# Patient Record
Sex: FEMALE | Race: WHITE | Employment: OTHER | URBAN - METROPOLITAN AREA
[De-identification: names, ages, dates, MRNs, and addresses within clinical notes are randomized per-mention and may not be internally consistent; named-entity substitution may affect disease eponyms.]

---

## 2019-07-17 ENCOUNTER — OFFICE VISIT (OUTPATIENT)
Dept: ORTHOPEDIC SURGERY | Age: 84
End: 2019-07-17

## 2019-07-17 VITALS
WEIGHT: 214 LBS | HEIGHT: 65 IN | TEMPERATURE: 96.9 F | DIASTOLIC BLOOD PRESSURE: 86 MMHG | OXYGEN SATURATION: 95 % | SYSTOLIC BLOOD PRESSURE: 125 MMHG | RESPIRATION RATE: 17 BRPM | HEART RATE: 67 BPM | BODY MASS INDEX: 35.65 KG/M2

## 2019-07-17 DIAGNOSIS — M72.2 PLANTAR FASCIITIS OF LEFT FOOT: Primary | ICD-10-CM

## 2019-07-17 DIAGNOSIS — E66.01 SEVERE OBESITY (HCC): ICD-10-CM

## 2019-07-17 RX ORDER — TRIAMCINOLONE ACETONIDE 40 MG/ML
40 INJECTION, SUSPENSION INTRA-ARTICULAR; INTRAMUSCULAR ONCE
Qty: 1 ML | Refills: 0
Start: 2019-07-17 | End: 2019-07-17

## 2019-07-17 NOTE — PATIENT INSTRUCTIONS
Plantar Fasciitis: Exercises  Your Care Instructions  Here are some examples of typical rehabilitation exercises for your condition. Start each exercise slowly. Ease off the exercise if you start to have pain. Your doctor or physical therapist will tell you when you can start these exercises and which ones will work best for you. How to do the exercises  Towel stretch    1. Sit with your legs extended and knees straight. 2. Place a towel around your foot just under the toes. 3. Hold each end of the towel in each hand, with your hands above your knees. 4. Pull back with the towel so that your foot stretches toward you. 5. Hold the position for at least 15 to 30 seconds. 6. Repeat 2 to 4 times a session, up to 5 sessions a day. Calf stretch    1. Stand facing a wall with your hands on the wall at about eye level. Put the leg you want to stretch about a step behind your other leg. 2. Keeping your back heel on the floor, bend your front knee until you feel a stretch in the back leg. 3. Hold the stretch for 15 to 30 seconds. Repeat 2 to 4 times. Plantar fascia and calf stretch    1. Stand on a step as shown above. Be sure to hold on to the banister. 2. Slowly let your heels down over the edge of the step as you relax your calf muscles. You should feel a gentle stretch across the bottom of your foot and up the back of your leg to your knee. 3. Hold the stretch about 15 to 30 seconds, and then tighten your calf muscle a little to bring your heel back up to the level of the step. Repeat 2 to 4 times. Towel curls    1. While sitting, place your foot on a towel on the floor and scrunch the towel toward you with your toes. 2. Then, also using your toes, push the towel away from you. [place] pickups    1. Put marbles on the floor next to a cup.  2. Using your toes, try to lift the marbles up from the floor and put them in the cup. Follow-up care is a key part of your treatment and safety.  Be sure to make and go to all appointments, and call your doctor if you are having problems. It's also a good idea to know your test results and keep a list of the medicines you take. Where can you learn more? Go to http://janay-franco.info/. Tamie Farah in the search box to learn more about \"Plantar Fasciitis: Exercises. \"  Current as of: September 20, 2018  Content Version: 11.9  © 20066528-1476 [employer]. Care instructions adapted under license by [employer] (which disclaims liability or warranty for this information). If you have questions about a medical condition or this instruction, always ask your healthcare professional. [address] any warranty or liability for your use of this information.

## 2019-07-17 NOTE — PROGRESS NOTES
AMBULATORY PROGRESS NOTE      Patient: Shai Calix             MRN: [id number]     SSN: [id number] Body mass index is 35.61 kg/m². YOB: 1933     AGE: 80 y.o. SEX: female    PCP: No primary care provider on file. IMPRESSION/DIAGNOSIS AND TREATMENT PLAN     DIAGNOSES  1. Plantar fasciitis of left foot    2. Severe obesity ([address]        Orders Placed This Encounter    INJECT TENDON SHEATH/LIGAMENT    AMB SUPPLY ORDER    TRIAMCINOLONE ACETONIDE INJ    triamcinolone acetonide (KENALOG) 40 mg/mL injection      Shai Calix understands her diagnoses and the proposed plan. Plan:    1) DME Order: [employer] heel inserts. 2) HEP with plantar fascial exercises. 3) Cortisone injection to the left plantar fascia: 1 mL Kenalog and 3 mL Lidocaine. 4) Use cryotherapy as directed after injection. 5) Continue activity modification as directed. RTO - 3 weeks     HPI AND EXAMINATION     Shai Calix IS A 80 y.o. female who presents to my outpatient office complaining of left foot pain. Ms. Jyothi Elliott reports that she has been experiencing pain in her left plantar heel for the past month. She notes that she experiences pain as soon as she walks in the mornings. She finds that she experiences the worst pain when she stands after sitting for a period of time. She states that she followed up with her PCP, who prescribed her Prednisone and provided exercises for her plantar fascia, which she notes did not help with her pain. She states that she finds herself limping at times. Of note, the patient states that she had Achilles tendinitis in her right leg, and recalls that after receiving an injection to her right knee, her Achilles tendon pain improved. She presents to the office today with [employer] shoes. The patient is currently taking Plavix.      Visit Vitals  /86 (BP 1 Location: Left arm, BP Patient Position: Sitting)   Pulse 67   Temp 96.9 °F (36.1 °C) (Oral)   Resp 17   Ht 5' 5\" (1.651 m)   Wt 214 lb (97.1 kg)   SpO2 95%   BMI 35.61 kg/m²     Appearance: Alert, well appearing and pleasant patient who is in no distress, oriented to person, place/time, and who follows commands. She is accompanied to the office by her . Psychiatric: Affect and mood are appropriate. Cardiovascular/Peripheral Vascular: Normal Pulses to each hand and foot  Musculoskeletal:  LOCATION:  Left FOOT/ANKLE  Integumentary: No rashes, skin patches, wounds, or abrasions to the right or left legs       Warm and normal color. No regions of expressible drainage. Palpable fullness or mass is not present      Gait: Limp with gait is not present       Tenderness: mild inferomedial PLANTAR FASCIA REGION, with no NODULARITIES        Mild tenderness to the central heel        NT to the Achilles tendon        Negative medial lateral compression test      Motor/Strength/Tone Exam: Normal DF, INV,EVERSION. Slightly diminished PF           strength due to plantar fascial tenderness      Sensory Exam:   Intact Normal Sensation to ankle/foot      Stability Testing: No anterolateral or varus instability of the Ankle or Subtalar Joints               No peroneal tendon instability noted      ROM: Normal ROM noted to ankle/foot      Contractures: No Achilles or Gastrocnemius Contractures      Calf tenderness: Absent for calf or gastrocnemius muscle regions       Soft, supple, non tender, non taut lower extremity compartments       Alignment: Neutral Hindfoot  Wounds/Abrasions:   None present  Extremities:   No embolic phenomena to the toes or hands         No significant edema to the foot and or toes.         Lower extremities are warm and appear well perfused    DVT: No evidence of DVT seen on examination at this time    No calf swelling, no tenderness to calf muscles  Lymphatic:  No Evidence of Lymphedema  Vascular: Medial Border of Tibia Region: Edema is not present        Pulses: Dorsalis Pedis &  Posterior Tibial Pulses : Palpable yes        Varicosities Lower Limbs: None    Neuro: Negative bilateral Straight leg raise (seated position)   no Tinel's at PM NV Bundle Tarsal Canal   no Proximal Tarsal Tunnel Tenderness    no Distal Tarsal Tunnel Tenderness    See Musculoskeletal section for pertinent individual extremity examination    No abnormal hand/wrist, foot/ankle, or facial/neck tremors. CHART REVIEW     Past Medical History:   Diagnosis Date    Arthritis     Hypertension     Left knee pain     Right shoulder pain      Current Outpatient Medications   Medication Sig    triamcinolone acetonide (KENALOG) 40 mg/mL injection 1 mL by IntraMUSCular route once for 1 dose.  furosemide (LASIX) 40 mg tablet     diclofenac (VOLTAREN) 1 % gel Apply 2 g to affected area four (4) times daily.  HYDROCHLOROTHIAZIDE PO Take  by mouth.  POTASSIUM PO Take  by mouth.  Glucosamine &Chondroit-MV-Min3 [id number] mg Tab Take  by mouth.  lisinopril (PRINIVIL, ZESTRIL) 20 mg tablet Take 20 mg by mouth daily. Indications: HYPERTENSION    atorvastatin (LIPITOR) 20 mg tablet Take 20 mg by mouth two (2) times a day. Indications: HYPERCHOLESTEROLEMIA    omega-3 fatty acids-vitamin e (FISH OIL) 1,000 mg cap Take 1 Cap by mouth daily. No current facility-administered medications for this visit. Allergies   Allergen Reactions    Sulfatrim Ds Itching    Tape [Adhesive] Rash     Past Surgical History:   Procedure Laterality Date    HX APPENDECTOMY      HX GYN      part hyst     Social History     Occupational History    Not on file   Tobacco Use    Smoking status: Former Smoker    Smokeless tobacco: Never Used   Substance and Sexual Activity    Alcohol use: No    Drug use: No    Sexual activity: Not on file     Family History   Adopted: Yes        REVIEW OF SYSTEMS : 7/17/2019  ALL BELOW ARE Negative except : SEE HPI     Constitutional: Negative for fever, chills and weight loss.  Neg Weight Loss  Cardiovascular: Negative for chest pain, claudication and leg swelling. SOB, VERMA   Gastrointestinal/Urological: Negative for  pain, N/V/D/C, Blood in stool or urine,dysuria                         Hematuria, Incontinence, pelvic pain  Musculoskeletal: see HPI. Neurological: Negative for dizziness and weakness, headaches,Visual Changes             Confusion,  Or Seizures,   Psychiatric/Behavioral: Negative for depression, memory loss and substance abuse. Extremities:  Negative for hair changes, rash or skin lesion changes. Hematologic: Negative for Bleeding problems, bruising, pallor or swollen lymph nodes. Peripheral Vascular: No calf pain, vascular vein tenderness to calf pain              No calf throbbing, posterior knee throbbing pain     DIAGNOSTIC IMAGING       Please see above section of this report. I have personally reviewed the results of the above study. The interpretation of this study is my professional opinion. PROCEDURE         [facility] PROCEDURE OUTPATIENT PROCEDURE    PROCEDURE:  Injection of the Left PLANTAR FASCIA     Chart reviewed for the following:     I, Doris Dillon MD, have reviewed the History, Physical and updated the Allergic reactions for [address] performed immediately prior to start of procedure:       * Patient was identified by name Yesy Kimball and date of birth   * Agreement on procedure being performed was verified  * Risks and Benefits explained to the patient: see below  * Procedure site verified and marked as necessary  * Patient was positioned for comfort  * Verbal Consent and Written Consent Verified by myself and my office staff. * Complications: None  *  All patient and/or family questions answered     The procedure was explained to the patient and possible adverse reactions were discussed.   These risks included, but not limited to: bleeding, infection, septic arthritis, local skin irritation (skin discoloration, hyperpigmentation, hypopigmentation, thinning of the skin, development of a wound, skin necrosis), synovitis, subcutaneous fat pad atrophy, subcutaneous abscess, tendon rupture. Time: 4:38 PM  Date of procedure: 7/17/2019  Procedure performed by: Sandra Moralez MD  Provider assisted by:  MA  Patient assisted by: self  How tolerated by patient: tolerated the procedure well with no complications  Comments: none    After verbal and informed consent, and after all patient and family questions answered, the procedure was performed as below: The Left MEDIAL HINDFOOT area was prepped and cleansed with: sterile fashion using a follow solution: ALCOHOL/BETADINE STERILE PREP. The injection was administered:  A solution of 40 mg of  KENALOG and 3 ml of LIDOCAINE % plain was used. The pain assessment score PRIOR/AFTER to the injection: 4 /10 //  too soon at determine due to this immediate injection /10 pain severity, mild intensity, aching Pain quality    Post injection instructions were given: removed band aid 3 hours, Wash site with clean soap, No further dressings there after. Call me if any: pain, redness, drainage, fever. Aspen Mcdowell tolerated the procedure well and was advised on the signs of infection and instructed to go to the ER or call the office if Aspen Ramirez Genjoanna becomes concerned about the area being infected. Written by Belen Hodge, as dictated by Dr. Girish Byers. I, Dr. Girish Byers, confirm that all documentation is accurate.

## 2022-03-20 PROBLEM — E66.01 SEVERE OBESITY (HCC): Status: ACTIVE | Noted: 2019-07-17

## 2023-05-18 ENCOUNTER — OFFICE VISIT (OUTPATIENT)
Age: 88
End: 2023-05-18

## 2023-05-18 VITALS — BODY MASS INDEX: 31.41 KG/M2 | WEIGHT: 184 LBS | HEIGHT: 64 IN

## 2023-05-18 DIAGNOSIS — M54.40 LOW BACK PAIN WITH SCIATICA, SCIATICA LATERALITY UNSPECIFIED, UNSPECIFIED BACK PAIN LATERALITY, UNSPECIFIED CHRONICITY: Primary | ICD-10-CM

## 2023-05-18 RX ORDER — GABAPENTIN 100 MG/1
CAPSULE ORAL
COMMUNITY
Start: 2023-05-09

## 2023-05-18 RX ORDER — LOSARTAN POTASSIUM AND HYDROCHLOROTHIAZIDE 12.5; 1 MG/1; MG/1
TABLET ORAL
COMMUNITY
Start: 2023-05-09

## 2023-05-18 RX ORDER — ATORVASTATIN CALCIUM 40 MG/1
TABLET, FILM COATED ORAL
COMMUNITY
Start: 2023-03-24

## 2023-05-18 RX ORDER — RANOLAZINE 500 MG/1
TABLET, EXTENDED RELEASE ORAL
COMMUNITY
Start: 2023-03-24

## 2023-05-18 RX ORDER — FUROSEMIDE 40 MG/1
TABLET ORAL
COMMUNITY
Start: 2023-05-10

## 2023-05-18 NOTE — PATIENT INSTRUCTIONS
Back Pain: Care Instructions  Overview     In most cases, there isn't a clear cause for back pain. It may be related to problems with muscles and ligaments of the back. It may also be related to problems with the nerves, discs, or bones of the back. Moving, lifting, standing, sitting, or sleeping in an awkward way can strain the back. Arthritis is another cause of back pain. Although it may hurt a lot, back pain usually improves on its own within several weeks. Most people recover in 12 weeks or less. Using self-care, such as ice or heat and light activity (like walking) may help you feel better sooner. Follow-up care is a key part of your treatment and safety. Be sure to make and go to all appointments, and call your doctor if you are having problems. It's also a good idea to know your test results and keep a list of the medicines you take. How can you care for yourself at home? Sit or lie in positions that are most comfortable and reduce your pain. Try one of these positions when you lie down:  Lie on your back with your knees bent and supported by pillows. Lie on the floor with your legs on the seat of a sofa or chair. Lie on your side with your knees and hips bent and a pillow between your legs. Lie on your stomach if it does not make pain worse. Do not sit up in bed, and avoid soft couches and twisted positions. Bed rest can help relieve pain at first, but it delays healing. Avoid bed rest after the first day of back pain. Change positions every 30 minutes. If you must sit for long periods of time, take breaks from sitting. Get up and walk around, or lie in a comfortable position. Try using a heating pad on a low or medium setting for 15 to 20 minutes every 2 or 3 hours. Try a warm shower in place of one session with the heating pad. You can also try an ice pack for 10 to 15 minutes every 2 to 3 hours. Put a thin cloth between the ice pack and your skin. Take pain medicines exactly as directed.   If

## 2023-05-18 NOTE — PROGRESS NOTES
Name: Brendan Cranker    : 1933     [address] [place], [address] [place] [id number]  Dept: [phone]  Dept Fax: [phone]     Chief Complaint   Patient presents with    Hip Pain        Ht 5' 4\" (1.626 m)   Wt 184 lb (83.5 kg)   BMI 31.58 kg/m²      No Known Allergies     Current Outpatient Medications   Medication Sig Dispense Refill    atorvastatin (LIPITOR) 40 MG tablet       gabapentin (NEURONTIN) 100 MG capsule       furosemide (LASIX) 40 MG tablet       ranolazine (RANEXA) 500 MG extended release tablet       losartan-hydroCHLOROthiazide (HYZAAR) 100-12.5 MG per tablet        No current facility-administered medications for this visit. Patient Active Problem List   Diagnosis    Severe obesity ([address]      Family History   Adopted: Yes   Problem Relation Age of Onset    No Known Problems Mother     No Known Problems Father       Social History     Socioeconomic History    Marital status:      Spouse name: None    Number of children: None    Years of education: None    Highest education level: None   Tobacco Use    Smoking status: Former    Smokeless tobacco: Never   Substance and Sexual Activity    Alcohol use: No    Drug use: No      Past Surgical History:   Procedure Laterality Date    APPENDECTOMY      GYN      part hyst      Past Medical History:   Diagnosis Date    Arthritis     Hypertension     Left knee pain     Right shoulder pain         I have reviewed and agree with [facility] Shasha and intake form in chart and the record furthermore I have reviewed prior medical record(s) regarding this patients care during this appointment.      Review of Systems:   Patient is a pleasant appearing individual, appropriately dressed, well hydrated, well nourished, who is alert, appropriately oriented for age, and in no acute distress with a normal gait and normal affect who does not appear to be in